# Patient Record
Sex: MALE | Race: WHITE | URBAN - METROPOLITAN AREA
[De-identification: names, ages, dates, MRNs, and addresses within clinical notes are randomized per-mention and may not be internally consistent; named-entity substitution may affect disease eponyms.]

---

## 2019-02-01 ENCOUNTER — HOSPITAL ENCOUNTER (OUTPATIENT)
Dept: MRI IMAGING | Facility: HOSPITAL | Age: 34
Discharge: HOME | End: 2019-02-01
Attending: ORTHOPAEDIC SURGERY

## 2019-08-14 ENCOUNTER — OFFICE VISIT CONVERTED (OUTPATIENT)
Dept: ORTHOPEDIC SURGERY | Facility: CLINIC | Age: 34
End: 2019-08-14
Attending: ORTHOPAEDIC SURGERY

## 2021-05-15 VITALS — HEIGHT: 70 IN | WEIGHT: 220.37 LBS | BODY MASS INDEX: 31.55 KG/M2 | HEART RATE: 65 BPM | OXYGEN SATURATION: 98 %

## 2025-02-27 ENCOUNTER — OFFICE VISIT (OUTPATIENT)
Dept: PODIATRY | Facility: CLINIC | Age: 40
End: 2025-02-27
Payer: COMMERCIAL

## 2025-02-27 VITALS
SYSTOLIC BLOOD PRESSURE: 133 MMHG | OXYGEN SATURATION: 98 % | WEIGHT: 232 LBS | HEART RATE: 72 BPM | BODY MASS INDEX: 33.29 KG/M2 | TEMPERATURE: 98 F | DIASTOLIC BLOOD PRESSURE: 87 MMHG

## 2025-02-27 DIAGNOSIS — L03.031 PARONYCHIA OF TOE OF RIGHT FOOT: ICD-10-CM

## 2025-02-27 DIAGNOSIS — M79.671 FOOT PAIN, RIGHT: Primary | ICD-10-CM

## 2025-02-27 DIAGNOSIS — L60.0 ONYCHOCRYPTOSIS: ICD-10-CM

## 2025-02-27 RX ORDER — FAMOTIDINE 10 MG
10 TABLET ORAL 2 TIMES DAILY
COMMUNITY

## 2025-02-28 NOTE — PROGRESS NOTES
[facility] - PODIATRY    Today's Date: 02/27/25    Patient Name: Chris Briceno  MRN: [id number]  CSN: [id number]  PCP: Carmina Andino APRN  Referring Provider: Carmina Andino APRN      SUBJECTIVE     Chief Complaint   Patient presents with    Right Foot - Ingrown Toenail, Establish Care     Referral from Carmina RUSSELL for right great ingrown toe nail     HPI: Chris Briceno, a 39 y.o.male, comes to clinic.    New  Location:  Medial and Lateral borders of the Right Hallux  Duration:   > one year  Onset:  Gradual  Nature:  sore with palpation.  Aggravating factors:  Pain with shoe gear and ambulation.  Previous Treatment:  previous nail procedure in distant past    No other pedal complaints at this time.    Patient denies any fevers, chills, nausea, vomiting, shortness of breath, nor any other constitutional signs nor symptoms.       History of Present Illness      History reviewed. No pertinent past medical history.  Past Surgical History:   Procedure Laterality Date    CHOLECYSTECTOMY      VASECTOMY       Family History   Problem Relation Age of Onset    Cancer Paternal Grandmother      Social History     Socioeconomic History    Marital status:    Tobacco Use    Smoking status: Never     Passive exposure: Never    Smokeless tobacco: Never   Vaping Use    Vaping status: Never Used   Substance and Sexual Activity    Alcohol use: Never    Drug use: Never    Sexual activity: Defer     No Known Allergies  Current Outpatient Medications   Medication Sig Dispense Refill    famotidine (PEPCID) 10 MG tablet Take 1 tablet by mouth 2 (Two) Times a Day.       No current facility-administered medications for this visit.     Review of Systems   Constitutional: Negative.    Skin:         Painful Right in-grown toenail   All other systems reviewed and are negative.      OBJECTIVE     Vitals:    02/27/25 1609   BP: 133/87   Pulse: 72   Temp: 98 °F (36.7 °C)   SpO2: 98%       PHYSICAL EXAM  GEN:       Foot/Ankle Exam    GENERAL  Appearance:  appears stated age  Orientation:  AAOx3  Affect:  appropriate  Gait:  unimpaired  Assistance:  independent  Right shoe gear: casual shoe  Left shoe gear: casual shoe    VASCULAR     Right Foot Vascularity   Normal vascular exam    Dorsalis pedis:  2+  Posterior tibial:  2+  Skin temperature:  warm  Edema grading:  None  CFT:  < 3 seconds  Pedal hair growth:  Present  Varicosities:  none     Left Foot Vascularity   Normal vascular exam    Dorsalis pedis:  2+  Posterior tibial:  2+  Skin temperature:  warm  Edema grading:  None  CFT:  < 3 seconds  Pedal hair growth:  Present  Varicosities:  none     NEUROLOGIC     Right Foot Neurologic   Normal sensation    Light touch sensation: normal  Vibratory sensation: normal  Hot/Cold sensation: normal     Left Foot Neurologic   Normal sensation    Light touch sensation: normal  Vibratory sensation: normal  Hot/Cold sensation:  normal    MUSCULOSKELETAL     Right Foot Musculoskeletal   Tenderness:  toe 1 tenderness      MUSCLE STRENGTH     Right Foot Muscle Strength   Foot dorsiflexion:  4  Foot plantar flexion:  4  Foot inversion:  4  Foot eversion:  4     Left Foot Muscle Strength   Foot dorsiflexion:  4  Foot plantar flexion:  4  Foot inversion:  4  Foot eversion:  4    RANGE OF MOTION     Right Foot Range of Motion   Foot and ankle ROM within normal limits       Left Foot Range of Motion   Foot and ankle ROM within normal limits      DERMATOLOGIC      Right Foot Dermatologic   Skin  Right foot skin is intact.   Nails  1.  Positive for ingrown toenail and paronychia. (Right 1st total nail plate and surrounding borders)  Nails comment:  Right 1st bilateral nail borders     Left Foot Dermatologic   Skin  Left foot skin is intact.       Physical Exam        ASSESSMENT/PLAN     Diagnoses and all orders for this visit:    1. Foot pain, right (Primary)    2. Onychocryptosis    3. Paronychia of toe of right foot        Assessment &  Plan      Comprehensive lower extremity examination and evaluation was performed.    Discussed findings and treatment plan including risks, benefits, and treatment options with patient in detail. Patient agreed with treatment plan.    Phenol and Alcohol Chemical Matrixectomy Procedure - This procedure is indicated for onychocryptosis of the Right 1st total nail plate and surrounding bordersnail borders. Indications, risks and benefits and alternative treatments have been discussed with this patient who has agreed to this procedure. The area was sterilely prepped with a povidone-iodine solution. The affected area was locally anesthetized with 3 ml, of 0.5% Marcaine plain. The offending nail plate was completely excised.  Next 3 applications of 89% phenol were applied to the matrix area x 30 seconds followed by irrigation with copious amounts of isopropyl alcohol.  A sterile dressing was applied. The patient tolerated the procedure well.     Patient was given post procedure care instructions.  The patient states understanding and agreement with this plan.    An After Visit Summary was printed and given to the patient at discharge, including (if requested) any available informative/educational handouts regarding diagnosis, treatment, or medications. All questions were answered to patient/family satisfaction. Should symptoms fail to improve or worsen they agree to call or return to clinic or to go to the Emergency Department. Discussed the importance of following up with any needed screening tests/labs/specialist appointments and any requested follow-up recommended by me today. Importance of maintaining follow-up discussed and patient accepts that missed appointments can delay diagnosis and potentially lead to worsening of conditions.    Return in about 2 weeks (around 3/13/2025) for Post-Procedure., or sooner if acute issues arise.    This document has been electronically signed by Donny Abreu DPM on February  27, 2025 19:30 EST

## 2025-03-13 ENCOUNTER — OFFICE VISIT (OUTPATIENT)
Dept: PODIATRY | Facility: CLINIC | Age: 40
End: 2025-03-13
Payer: COMMERCIAL

## 2025-03-13 VITALS
HEIGHT: 70 IN | WEIGHT: 227 LBS | BODY MASS INDEX: 32.5 KG/M2 | TEMPERATURE: 97.3 F | HEART RATE: 93 BPM | DIASTOLIC BLOOD PRESSURE: 99 MMHG | SYSTOLIC BLOOD PRESSURE: 149 MMHG | OXYGEN SATURATION: 95 %

## 2025-03-13 DIAGNOSIS — M79.671 FOOT PAIN, RIGHT: ICD-10-CM

## 2025-03-13 DIAGNOSIS — L60.0 ONYCHOCRYPTOSIS: ICD-10-CM

## 2025-03-13 DIAGNOSIS — L03.031 PARONYCHIA OF TOE OF RIGHT FOOT: Primary | ICD-10-CM

## 2025-03-13 NOTE — PROGRESS NOTES
[facility] - PODIATRY    Today's Date: 25    Patient Name: Chris Briceno  MRN: [id number]  CSN: [id number]  PCP: Carmina Andino APRN  Referring Provider: No ref. provider found      SUBJECTIVE     Chief Complaint   Patient presents with    Right Foot - Follow-up, Ingrown Toenail     HPI: Chris Briceno, a 39 y.o.male, comes to clinic.    Established  Location:  Medial and Lateral borders of the Right Hallux  Duration:   > one year  Onset:  Gradual  Nature:  sore with palpation.  Aggravating factors:  Pain with shoe gear and ambulation.  Previous Treatment:  previous nail procedure in distant past chemical matrixectomy    No other pedal complaints at this time.    Patient denies any fevers, chills, nausea, vomiting, shortness of breath, nor any other constitutional signs nor symptoms.       History of Present Illness      Past Medical History:   Diagnosis Date    Diabetes mellitus     pre diabetes    Ingrown toenail - present    had 4 procedures    Plantar fasciitis     off and on     Past Surgical History:   Procedure Laterality Date    CHOLECYSTECTOMY      VASECTOMY       Family History   Problem Relation Age of Onset    Cancer Paternal Grandmother     Cancer Maternal Grandfather      Social History     Socioeconomic History    Marital status:    Tobacco Use    Smoking status: Former     Current packs/day: 0.00     Average packs/day: 1 pack/day for 3.0 years (3.0 ttl pk-yrs)     Types: Cigarettes     Start date: 1995     Quit date: 1998     Years since quittin.2     Passive exposure: Never    Smokeless tobacco: Never   Vaping Use    Vaping status: Never Used   Substance and Sexual Activity    Alcohol use: Never    Drug use: Never    Sexual activity: Yes     Partners: Female     Birth control/protection: None     No Known Allergies  Current Outpatient Medications   Medication Sig Dispense Refill    famotidine (PEPCID) 10 MG tablet Take 1 tablet by mouth 2  (Two) Times a Day.       No current facility-administered medications for this visit.     Review of Systems   Constitutional: Negative.    Skin:         Painful Right in-grown toenail   All other systems reviewed and are negative.      OBJECTIVE     Vitals:    03/13/25 1543   BP: 149/99   Pulse: 93   Temp: 97.3 °F (36.3 °C)   SpO2: 95%       PHYSICAL EXAM  GEN:      Foot/Ankle Exam    GENERAL  Appearance:  appears stated age  Orientation:  AAOx3  Affect:  appropriate  Gait:  unimpaired  Assistance:  independent  Right shoe gear: casual shoe  Left shoe gear: casual shoe    VASCULAR     Right Foot Vascularity   Normal vascular exam    Dorsalis pedis:  2+  Posterior tibial:  2+  Skin temperature:  warm  Edema grading:  None  CFT:  < 3 seconds  Pedal hair growth:  Present  Varicosities:  none     Left Foot Vascularity   Normal vascular exam    Dorsalis pedis:  2+  Posterior tibial:  2+  Skin temperature:  warm  Edema grading:  None  CFT:  < 3 seconds  Pedal hair growth:  Present  Varicosities:  none     NEUROLOGIC     Right Foot Neurologic   Normal sensation    Light touch sensation: normal  Vibratory sensation: normal  Hot/Cold sensation: normal     Left Foot Neurologic   Normal sensation    Light touch sensation: normal  Vibratory sensation: normal  Hot/Cold sensation:  normal    MUSCULOSKELETAL     Right Foot Musculoskeletal   Tenderness:  toe 1 tenderness      MUSCLE STRENGTH     Right Foot Muscle Strength   Foot dorsiflexion:  4  Foot plantar flexion:  4  Foot inversion:  4  Foot eversion:  4     Left Foot Muscle Strength   Foot dorsiflexion:  4  Foot plantar flexion:  4  Foot inversion:  4  Foot eversion:  4    RANGE OF MOTION     Right Foot Range of Motion   Foot and ankle ROM within normal limits       Left Foot Range of Motion   Foot and ankle ROM within normal limits      DERMATOLOGIC      Right Foot Dermatologic   Skin  Right foot skin is intact.   Nails  1.  Negative for ingrown toenail and paronychia.  (Right 1st total nail plate and surrounding borders; healing without complications.)  Nails comment:  Right 1st bilateral nail borders     Left Foot Dermatologic   Skin  Left foot skin is intact.       Physical Exam        ASSESSMENT/PLAN     Diagnoses and all orders for this visit:    1. Paronychia of toe of right foot (Primary)    2. Onychocryptosis    3. Foot pain, right        Assessment & Plan      Comprehensive lower extremity examination and evaluation was performed.    Discussed findings and treatment plan including risks, benefits, and treatment options with patient in detail. Patient agreed with treatment plan.    Patient is to monitor for recurrence and any new symptoms and to contact Dr. Abreu's office for a follow-up appointment.      The patient states understanding and agreement with this plan.    An After Visit Summary was printed and given to the patient at discharge, including (if requested) any available informative/educational handouts regarding diagnosis, treatment, or medications. All questions were answered to patient/family satisfaction. Should symptoms fail to improve or worsen they agree to call or return to clinic or to go to the Emergency Department. Discussed the importance of following up with any needed screening tests/labs/specialist appointments and any requested follow-up recommended by me today. Importance of maintaining follow-up discussed and patient accepts that missed appointments can delay diagnosis and potentially lead to worsening of conditions.    Return if symptoms worsen or fail to improve., or sooner if acute issues arise.    This document has been electronically signed by Donny Abreu DPM on March 13, 2025 16:34 EDT